# Patient Record
Sex: MALE | Race: WHITE | NOT HISPANIC OR LATINO | Employment: PART TIME | URBAN - METROPOLITAN AREA
[De-identification: names, ages, dates, MRNs, and addresses within clinical notes are randomized per-mention and may not be internally consistent; named-entity substitution may affect disease eponyms.]

---

## 2018-02-04 ENCOUNTER — HOSPITAL ENCOUNTER (EMERGENCY)
Facility: HOSPITAL | Age: 23
Discharge: HOME | End: 2018-02-04
Attending: EMERGENCY MEDICINE
Payer: MEDICAID

## 2018-02-04 VITALS
HEIGHT: 68 IN | DIASTOLIC BLOOD PRESSURE: 69 MMHG | BODY MASS INDEX: 20.46 KG/M2 | OXYGEN SATURATION: 100 % | TEMPERATURE: 98 F | RESPIRATION RATE: 18 BRPM | HEART RATE: 61 BPM | SYSTOLIC BLOOD PRESSURE: 117 MMHG | WEIGHT: 135 LBS

## 2018-02-04 DIAGNOSIS — R11.2 NON-INTRACTABLE VOMITING WITH NAUSEA, UNSPECIFIED VOMITING TYPE: Primary | ICD-10-CM

## 2018-02-04 DIAGNOSIS — F10.920 ALCOHOLIC INTOXICATION WITHOUT COMPLICATION: ICD-10-CM

## 2018-02-04 LAB
ALBUMIN SERPL BCP-MCNC: 4.7 G/DL
ALP SERPL-CCNC: 85 U/L
ALT SERPL W/O P-5'-P-CCNC: 34 U/L
ANION GAP SERPL CALC-SCNC: 8 MMOL/L
AST SERPL-CCNC: 30 U/L
BASOPHILS # BLD AUTO: 0.03 K/UL
BASOPHILS NFR BLD: 0.4 %
BILIRUB SERPL-MCNC: 0.7 MG/DL
BUN SERPL-MCNC: 9 MG/DL
CALCIUM SERPL-MCNC: 9.4 MG/DL
CHLORIDE SERPL-SCNC: 105 MMOL/L
CO2 SERPL-SCNC: 27 MMOL/L
CREAT SERPL-MCNC: 1 MG/DL
DIFFERENTIAL METHOD: ABNORMAL
EOSINOPHIL # BLD AUTO: 0.1 K/UL
EOSINOPHIL NFR BLD: 0.9 %
ERYTHROCYTE [DISTWIDTH] IN BLOOD BY AUTOMATED COUNT: 13.1 %
EST. GFR  (AFRICAN AMERICAN): >60 ML/MIN/1.73 M^2
EST. GFR  (NON AFRICAN AMERICAN): >60 ML/MIN/1.73 M^2
ETHANOL SERPL-MCNC: 121 MG/DL
GLUCOSE SERPL-MCNC: 174 MG/DL
HCT VFR BLD AUTO: 42 %
HGB BLD-MCNC: 14.7 G/DL
LYMPHOCYTES # BLD AUTO: 0.8 K/UL
LYMPHOCYTES NFR BLD: 10.1 %
MCH RBC QN AUTO: 31.6 PG
MCHC RBC AUTO-ENTMCNC: 35 G/DL
MCV RBC AUTO: 90 FL
MONOCYTES # BLD AUTO: 0.4 K/UL
MONOCYTES NFR BLD: 5.3 %
NEUTROPHILS # BLD AUTO: 6.5 K/UL
NEUTROPHILS NFR BLD: 83.3 %
PLATELET # BLD AUTO: 148 K/UL
PMV BLD AUTO: 9.9 FL
POTASSIUM SERPL-SCNC: 4.1 MMOL/L
PROT SERPL-MCNC: 7.2 G/DL
RBC # BLD AUTO: 4.65 M/UL
SODIUM SERPL-SCNC: 140 MMOL/L
WBC # BLD AUTO: 7.74 K/UL

## 2018-02-04 PROCEDURE — 63600175 PHARM REV CODE 636 W HCPCS: Performed by: EMERGENCY MEDICINE

## 2018-02-04 PROCEDURE — 96374 THER/PROPH/DIAG INJ IV PUSH: CPT

## 2018-02-04 PROCEDURE — 36415 COLL VENOUS BLD VENIPUNCTURE: CPT

## 2018-02-04 PROCEDURE — 80053 COMPREHEN METABOLIC PANEL: CPT

## 2018-02-04 PROCEDURE — 85025 COMPLETE CBC W/AUTO DIFF WBC: CPT

## 2018-02-04 PROCEDURE — 96361 HYDRATE IV INFUSION ADD-ON: CPT

## 2018-02-04 PROCEDURE — 99283 EMERGENCY DEPT VISIT LOW MDM: CPT | Mod: 25

## 2018-02-04 PROCEDURE — 25000003 PHARM REV CODE 250: Performed by: EMERGENCY MEDICINE

## 2018-02-04 PROCEDURE — 80320 DRUG SCREEN QUANTALCOHOLS: CPT

## 2018-02-04 RX ORDER — ONDANSETRON 4 MG/1
4 TABLET, FILM COATED ORAL EVERY 8 HOURS PRN
Qty: 12 TABLET | Refills: 0 | Status: SHIPPED | OUTPATIENT
Start: 2018-02-04

## 2018-02-04 RX ORDER — ONDANSETRON 2 MG/ML
4 INJECTION INTRAMUSCULAR; INTRAVENOUS
Status: COMPLETED | OUTPATIENT
Start: 2018-02-04 | End: 2018-02-04

## 2018-02-04 RX ADMIN — SODIUM CHLORIDE 1000 ML: 0.9 INJECTION, SOLUTION INTRAVENOUS at 05:02

## 2018-02-04 RX ADMIN — ONDANSETRON 4 MG: 2 INJECTION INTRAMUSCULAR; INTRAVENOUS at 05:02

## 2018-02-04 NOTE — ED PROVIDER NOTES
"SCRIBE #1 NOTE: I, Joan Zhang, am scribing for, and in the presence of, Jessica Bowens MD. I have scribed the HPI, ROS, and PEx of the note.     SCRIBE #2 NOTE: I, Adelfoferddy Josue, am scribing for, and in the presence of,  Melissa Brannon MD. I have scribed the remaining portions of the note not scribed by Scribe #1.     History      Chief Complaint   Patient presents with    Emesis     etoh , he states " i think i have alcohol poison"        Review of patient's allergies indicates:  No Known Allergies     HPI   HPI    2/4/2018, 5:25 AM   History obtained from the patient      History of Present Illness: Sameer Chakraborty is a 22 y.o. male patient who presents to the Emergency Department for emesis which onset PTA. Symptoms are episodic and moderate in severity. Pt states, "i think I have alcohol poison".  Pt reports having 6-7 mixed drinks. No mitigating or exacerbating factors reported. Associated sxs include nausea. Patient denies any LOC, HA, diarrhea, weakness, numbness, and all other sxs at this time. No prior Tx. No further complaints or concerns at this time.     Arrival mode: Personal vehicle     PCP: Primary Doctor No       Past Medical History:  Past Medical History:   Diagnosis Date    ADHD (attention deficit hyperactivity disorder)      Past Surgical History:  Past surgical history reviewed not relevant      Family History:  Family history reviewed not relevant    Social History:  Social History     Social History Main Topics    Smoking status: Former Smoker    Smokeless tobacco: unknown    Alcohol use No    Drug use: Unknown    Sexual activity: Yes       ROS   Review of Systems   Constitutional: Negative for chills and fever.        (+) Etoh   HENT: Negative for sore throat.    Respiratory: Negative for shortness of breath.    Cardiovascular: Negative for chest pain.   Gastrointestinal: Positive for nausea and vomiting. Negative for abdominal pain and diarrhea.   Genitourinary: Negative for " "dysuria.   Musculoskeletal: Negative for back pain and neck pain.   Skin: Negative for rash.   Neurological: Negative for syncope, weakness, numbness and headaches.   Hematological: Does not bruise/bleed easily.   All other systems reviewed and are negative.      Physical Exam      Initial Vitals [02/04/18 0505]   BP Pulse Resp Temp SpO2   117/69 61 18 98.4 °F (36.9 °C) 100 %      MAP       85          Physical Exam  Nursing Notes and Vital Signs Reviewed.  Constitutional: Patient is in no acute distress. Well-developed and well-nourished.  Head: Atraumatic. Normocephalic.  Eyes: PERRL. EOM intact. Conjunctivae are not pale. No scleral icterus.  ENT: Mucous membranes are moist. Oropharynx is clear and symmetric.    Neck: Supple. Full ROM. No lymphadenopathy.  Cardiovascular: Regular rate. Regular rhythm. No murmurs, rubs, or gallops. Distal pulses are 2+ and symmetric.  Pulmonary/Chest: No respiratory distress. Clear to auscultation bilaterally. No wheezing or rales.  Abdominal: Soft and non-distended.  There is no tenderness.  No rebound, guarding, or rigidity. Good bowel sounds.  Genitourinary: No CVA tenderness  Musculoskeletal: Moves all extremities. No obvious deformities. No edema. No calf tenderness.  Skin: Warm and dry.  Neurological: Patient is alert and oriented to person, place and time. Pupils ERRL and EOM normal. Cranial nerves II-XII are intact. Strength is full bilaterally; it is equal and 5/5 in bilateral upper and lower extremities. There is no pronator drift of outstretched arms. Light touch sense is intact. Speech is clear and normal. No acute focal neurological deficits noted.  Psychiatric: Normal affect. Good eye contact. Appropriate in content.    ED Course    Procedures  ED Vital Signs:  Vitals:    02/04/18 0505   BP: 117/69   Pulse: 61   Resp: 18   Temp: 98.4 °F (36.9 °C)   SpO2: 100%   Weight: 61.2 kg (135 lb)   Height: 5' 8" (1.727 m)       Abnormal Lab Results:  Labs Reviewed   CBC W/ AUTO " DIFFERENTIAL - Abnormal; Notable for the following:        Result Value    MCH 31.6 (*)     Platelets 148 (*)     Lymph # 0.8 (*)     Gran% 83.3 (*)     Lymph% 10.1 (*)     All other components within normal limits   COMPREHENSIVE METABOLIC PANEL - Abnormal; Notable for the following:     Glucose 174 (*)     All other components within normal limits   ALCOHOL,MEDICAL (ETHANOL) - Abnormal; Notable for the following:     Alcohol, Medical, Serum 121 (*)     All other components within normal limits        All Lab Results:  Results for orders placed or performed during the hospital encounter of 02/04/18   CBC auto differential   Result Value Ref Range    WBC 7.74 3.90 - 12.70 K/uL    RBC 4.65 4.60 - 6.20 M/uL    Hemoglobin 14.7 14.0 - 18.0 g/dL    Hematocrit 42.0 40.0 - 54.0 %    MCV 90 82 - 98 fL    MCH 31.6 (H) 27.0 - 31.0 pg    MCHC 35.0 32.0 - 36.0 g/dL    RDW 13.1 11.5 - 14.5 %    Platelets 148 (L) 150 - 350 K/uL    MPV 9.9 9.2 - 12.9 fL    Gran # (ANC) 6.5 1.8 - 7.7 K/uL    Lymph # 0.8 (L) 1.0 - 4.8 K/uL    Mono # 0.4 0.3 - 1.0 K/uL    Eos # 0.1 0.0 - 0.5 K/uL    Baso # 0.03 0.00 - 0.20 K/uL    Gran% 83.3 (H) 38.0 - 73.0 %    Lymph% 10.1 (L) 18.0 - 48.0 %    Mono% 5.3 4.0 - 15.0 %    Eosinophil% 0.9 0.0 - 8.0 %    Basophil% 0.4 0.0 - 1.9 %    Differential Method Automated    Comprehensive metabolic panel   Result Value Ref Range    Sodium 140 136 - 145 mmol/L    Potassium 4.1 3.5 - 5.1 mmol/L    Chloride 105 95 - 110 mmol/L    CO2 27 23 - 29 mmol/L    Glucose 174 (H) 70 - 110 mg/dL    BUN, Bld 9 6 - 20 mg/dL    Creatinine 1.0 0.5 - 1.4 mg/dL    Calcium 9.4 8.7 - 10.5 mg/dL    Total Protein 7.2 6.0 - 8.4 g/dL    Albumin 4.7 3.5 - 5.2 g/dL    Total Bilirubin 0.7 0.1 - 1.0 mg/dL    Alkaline Phosphatase 85 55 - 135 U/L    AST 30 10 - 40 U/L    ALT 34 10 - 44 U/L    Anion Gap 8 8 - 16 mmol/L    eGFR if African American >60 >60 mL/min/1.73 m^2    eGFR if non African American >60 >60 mL/min/1.73 m^2   Ethanol   Result  Value Ref Range    Alcohol, Medical, Serum 121 (H) <10 mg/dL                The Emergency Provider reviewed the vital signs and test results, which are outlined above.    ED Discussion     5:59 AM: Dr. Bowens transfers care of pt to Dr. Brannon, pending labs results.    7:32 AM: Dr. Brannon re-evaluated pt. Pt is resting comfortably and is in no acute distress. D/w pt all pertinent results. Pt will be PO challenged prior to discharge. Answered all pt's questions. Pt expresses understanding at this time.    7:55 AM: Reassessed pt. Pt states their condition has improved at this time. Pt tolerating PO, friend at bedside. Discussed with pt all pertinent ED information and results. Discussed plan of treatment with pt. Gave pt all f/u and return to the ED instructions. All questions and concerns were addressed at this time. Pt understands and agrees to plan as discussed. Pt is stable for discharge.     ED Medication(s):  Medications   sodium chloride 0.9% bolus 1,000 mL (0 mLs Intravenous Stopped 2/4/18 0625)   ondansetron injection 4 mg (4 mg Intravenous Given 2/4/18 3289)       New Prescriptions    ONDANSETRON (ZOFRAN) 4 MG TABLET    Take 1 tablet (4 mg total) by mouth every 8 (eight) hours as needed.       Follow-up Information     [facility]. Schedule an appointment as soon as possible for a visit in 2 days.    Why:  Return to the Emergency room, If symptoms worsen  Contact information:  [address] [place] [id number] [phone]                     Medical Decision Making    Medical Decision Making:   Clinical Tests:   Lab Tests: Reviewed and Ordered           Scribe Attestation:   Scribe #1: I performed the above scribed service and the documentation accurately describes the services I performed. I attest to the accuracy of the note.    Attending:   Physician Attestation Statement for Scribe #1: I, Jessica Bowens MD, personally performed the services described in this documentation, as  scribed by Joan Zhang, in my presence, and it is both accurate and complete.       Scribe Attestation:   Scribe #2: I performed the above scribed service and the documentation accurately describes the services I performed. I attest to the accuracy of the note.    Attending Attestation:           Physician Attestation for Scribe:    Physician Attestation Statement for Scribe #2: I, Melissa Brannon MD, reviewed documentation, as scribed by Adelfo Josue in my presence, and it is both accurate and complete. I also acknowledge and confirm the content of the note done by Scribe #1.          Clinical Impression       ICD-10-CM ICD-9-CM   1. Non-intractable vomiting with nausea, unspecified vomiting type R11.2 787.01   2. Alcoholic intoxication without complication F10.920 305.00       Disposition:   Disposition: Discharged  Condition: Stable         Melissa Brannon MD  02/06/18 0115

## 2018-09-09 ENCOUNTER — OFFICE VISIT (OUTPATIENT)
Dept: URGENT CARE | Facility: CLINIC | Age: 23
End: 2018-09-09
Payer: MEDICAID

## 2018-09-09 VITALS
OXYGEN SATURATION: 96 % | HEIGHT: 68 IN | SYSTOLIC BLOOD PRESSURE: 140 MMHG | HEART RATE: 63 BPM | TEMPERATURE: 98 F | RESPIRATION RATE: 16 BRPM | BODY MASS INDEX: 20 KG/M2 | DIASTOLIC BLOOD PRESSURE: 76 MMHG | WEIGHT: 132 LBS

## 2018-09-09 DIAGNOSIS — Z20.2 STD EXPOSURE: Primary | ICD-10-CM

## 2018-09-09 PROCEDURE — 99203 OFFICE O/P NEW LOW 30 MIN: CPT | Mod: S$GLB,,, | Performed by: PHYSICIAN ASSISTANT

## 2018-09-09 NOTE — PROGRESS NOTES
"Subjective:       Patient ID: Sameer Chakraborty is a 22 y.o. male.    Vitals:  height is 5' 8" (1.727 m) and weight is 59.9 kg (132 lb). His tympanic temperature is 97.6 °F (36.4 °C). His blood pressure is 140/76 (abnormal) and his pulse is 63. His respiration is 16 and oxygen saturation is 96%.     Chief Complaint: Exposure to STD    Patient is not having symptoms but has been exposed to Herpes.      Exposure to STD   The patient is experiencing no pain. Pertinent negatives include no abdominal pain, chest pain, chills, diarrhea, fever, headaches, joint pain, nausea, rash, shortness of breath, sore throat or vomiting. He never uses condoms. It is possible that his partner has an STD.     Review of Systems   Constitution: Negative. Negative for chills and fever.   HENT: Negative for sore throat.    Eyes: Negative for blurred vision.   Cardiovascular: Negative for chest pain.   Respiratory: Negative for shortness of breath.    Skin: Negative for rash.   Musculoskeletal: Negative.  Negative for back pain and joint pain.   Gastrointestinal: Negative for abdominal pain, diarrhea, nausea and vomiting.   Neurological: Negative for headaches.   Psychiatric/Behavioral: The patient is not nervous/anxious.        Objective:      Physical Exam   Constitutional: He is oriented to person, place, and time. He appears well-developed and well-nourished. He is cooperative.  Non-toxic appearance. He does not appear ill. No distress.   HENT:   Head: Normocephalic and atraumatic.   Right Ear: Hearing, tympanic membrane, external ear and ear canal normal.   Left Ear: Hearing, tympanic membrane, external ear and ear canal normal.   Nose: Nose normal. No mucosal edema, rhinorrhea or nasal deformity. No epistaxis. Right sinus exhibits no maxillary sinus tenderness and no frontal sinus tenderness. Left sinus exhibits no maxillary sinus tenderness and no frontal sinus tenderness.   Mouth/Throat: Uvula is midline, oropharynx is clear and moist " and mucous membranes are normal. No trismus in the jaw. Normal dentition. No uvula swelling. No posterior oropharyngeal erythema.   Eyes: Conjunctivae and lids are normal. Right eye exhibits no discharge. Left eye exhibits no discharge. No scleral icterus.   Sclera clear bilat   Neck: Trachea normal, normal range of motion, full passive range of motion without pain and phonation normal. Neck supple.   Cardiovascular: Normal rate, regular rhythm, normal heart sounds, intact distal pulses and normal pulses.   Pulmonary/Chest: Effort normal and breath sounds normal. No respiratory distress.   Abdominal: Soft. Normal appearance and bowel sounds are normal. He exhibits no distension, no pulsatile midline mass and no mass. There is no tenderness.   Musculoskeletal: Normal range of motion. He exhibits no edema or deformity.   Neurological: He is alert and oriented to person, place, and time. He exhibits normal muscle tone. Coordination normal.   Skin: Skin is warm, dry and intact. He is not diaphoretic. No pallor.   Psychiatric: He has a normal mood and affect. His speech is normal and behavior is normal. Judgment and thought content normal. Cognition and memory are normal.   Nursing note and vitals reviewed.      Assessment:       1. STD exposure        Plan:         STD exposure  -     C. trachomatis/N. gonorrhoeae by AMP DNA  -     HIV-1 and HIV-2 antibodies  -     Herpes simplex type 1&2 IgG,Herpes titer  -     RPR          What Are Sexually Transmitted Diseases (STDs)?  A sexually transmitted disease (STD) is a disease that is spread during sex. (An STD can also be called STI for sexually transmitted infection.) You can become infected with an STD if you have sex with someone who has an STD. Any sex that involves the penis, vagina, anus, or mouth can spread disease. Some STDs spread through body fluids such as semen, vaginal fluid, or blood. Others spread through contact with affected skin.  Who is at risk?     Places  on or in the body where STDs cause damage include reproductive organs, the rectum, and the mouth.   It doesnt matter if youre straight or glez, male or female, young or old. Any person who has sex can get an STD. Your risk increases if:  · You have more than one partner. The more partners you have, the greater your risk.  · Your partner has other partners. If your partner is exposed to an STD, you could be, too.  · You or your partner have had sex with other people in the past. Either of you might be carrying an STD from an earlier partner.  · You have an STD. The STD may cause sores or other health problems that increase your risk of new infections. Your risk will stay high unless you change the behaviors that put you at risk of the current infection.  Prevent future problems  Left untreated, certain STDs can lead to cancer or even death. Some can harm unborn babies whose mothers are infected. Others can cause you to not be able to have children (sterility) or can affect changes in behavior or your ability to think. You can prevent these problems with safer sex, regular checkups, and early treatment. Always use a latex condom when you have sex. Get tested if youre at risk. And get treated early if you have an STD.  Getting checked  The only sure way to know if you have an STD is to get checked by a healthcare provider. If you notice a change in how your body looks or feels, have it checked out. But keep in mind, STDs dont always show symptoms. So if youre at risk of STDs, get checked regularly. If you find you have an STD, be sure your partner gets treatment, too. If not, his or her health is at risk. And left untreated, your partner could pass the STD back to you, or on to others.  Common symptoms  Be alert to any changes in your body and your partners body. Symptoms may appear in or near the vagina, penis, rectum, mouth, or throat. They include:  · Unusual discharge  · Lumps, bumps, or rashes  · Sores that  may be painful, itchy, or painless  · Itchy skin  · Burning with urination  · Pain in the pelvis, belly (abdomen), or rectum  Even if you dont have symptoms  You may have an STD, even if you dont have symptoms. If you think you are at risk, get checked. Go to a clinic or to your healthcare provider. If your partner has an STD, you need to be tested too, even if you feel fine.  Vaccines to prevent disease  Vaccines (also called immunizations) are available to prevent hepatitis A and hepatitis B. These are two kinds of STDs. There is also a vaccine to prevent HPV. This is a virus that can be passed from person to person through sexual contact. Ask your healthcare provider whether any of these vaccines is right for you.   Date Last Reviewed: 11/1/2016  © 6830-9906 [employer]. [address] [place] [address]. All rights reserved. This information is not intended as a substitute for professional medical care. Always follow your healthcare professional's instructions.      WE WILL CONTACT YOU WITH YOUR RESULTS.     Please follow up with your Primary care provider within 2-5 days if your signs and symptoms have not resolved or worsen.     If your condition worsens or fails to improve we recommend that you receive another evaluation at the emergency room immediately or contact your primary medical clinic to discuss your concerns.   You must understand that you have received an Urgent Care treatment only and that you may be released before all of your medical problems are known or treated. You, the patient, will arrange for follow up care as instructed.     RED FLAGS/WARNING SYMPTOMS DISCUSSED WITH PATIENT THAT WOULD WARRANT EMERGENT MEDICAL ATTENTION. PATIENT VERBALIZED UNDERSTANDING.

## 2018-09-09 NOTE — PATIENT INSTRUCTIONS
What Are Sexually Transmitted Diseases (STDs)?  A sexually transmitted disease (STD) is a disease that is spread during sex. (An STD can also be called STI for sexually transmitted infection.) You can become infected with an STD if you have sex with someone who has an STD. Any sex that involves the penis, vagina, anus, or mouth can spread disease. Some STDs spread through body fluids such as semen, vaginal fluid, or blood. Others spread through contact with affected skin.  Who is at risk?     Places on or in the body where STDs cause damage include reproductive organs, the rectum, and the mouth.   It doesnt matter if youre straight or glez, male or female, young or old. Any person who has sex can get an STD. Your risk increases if:  · You have more than one partner. The more partners you have, the greater your risk.  · Your partner has other partners. If your partner is exposed to an STD, you could be, too.  · You or your partner have had sex with other people in the past. Either of you might be carrying an STD from an earlier partner.  · You have an STD. The STD may cause sores or other health problems that increase your risk of new infections. Your risk will stay high unless you change the behaviors that put you at risk of the current infection.  Prevent future problems  Left untreated, certain STDs can lead to cancer or even death. Some can harm unborn babies whose mothers are infected. Others can cause you to not be able to have children (sterility) or can affect changes in behavior or your ability to think. You can prevent these problems with safer sex, regular checkups, and early treatment. Always use a latex condom when you have sex. Get tested if youre at risk. And get treated early if you have an STD.  Getting checked  The only sure way to know if you have an STD is to get checked by a healthcare provider. If you notice a change in how your body looks or feels, have it checked out. But keep in mind,  STDs dont always show symptoms. So if youre at risk of STDs, get checked regularly. If you find you have an STD, be sure your partner gets treatment, too. If not, his or her health is at risk. And left untreated, your partner could pass the STD back to you, or on to others.  Common symptoms  Be alert to any changes in your body and your partners body. Symptoms may appear in or near the vagina, penis, rectum, mouth, or throat. They include:  · Unusual discharge  · Lumps, bumps, or rashes  · Sores that may be painful, itchy, or painless  · Itchy skin  · Burning with urination  · Pain in the pelvis, belly (abdomen), or rectum  Even if you dont have symptoms  You may have an STD, even if you dont have symptoms. If you think you are at risk, get checked. Go to a clinic or to your healthcare provider. If your partner has an STD, you need to be tested too, even if you feel fine.  Vaccines to prevent disease  Vaccines (also called immunizations) are available to prevent hepatitis A and hepatitis B. These are two kinds of STDs. There is also a vaccine to prevent HPV. This is a virus that can be passed from person to person through sexual contact. Ask your healthcare provider whether any of these vaccines is right for you.   Date Last Reviewed: 11/1/2016 © 2000-2017 [employer]. [address] [place] [address]. All rights reserved. This information is not intended as a substitute for professional medical care. Always follow your healthcare professional's instructions.      WE WILL CONTACT YOU WITH YOUR RESULTS.     Please follow up with your Primary care provider within 2-5 days if your signs and symptoms have not resolved or worsen.     If your condition worsens or fails to improve we recommend that you receive another evaluation at the emergency room immediately or contact your primary medical clinic to discuss your concerns.   You must understand that you have received an Urgent Care treatment  only and that you may be released before all of your medical problems are known or treated. You, the patient, will arrange for follow up care as instructed.     RED FLAGS/WARNING SYMPTOMS DISCUSSED WITH PATIENT THAT WOULD WARRANT EMERGENT MEDICAL ATTENTION. PATIENT VERBALIZED UNDERSTANDING.

## 2018-09-11 ENCOUNTER — TELEPHONE (OUTPATIENT)
Dept: URGENT CARE | Facility: CLINIC | Age: 23
End: 2018-09-11

## 2018-09-11 LAB
C TRACH RRNA SPEC QL NAA+PROBE: NEGATIVE
HIV 1+2 AB+HIV1 P24 AG SERPL QL IA: NON REACTIVE
HSV1 IGG SER IA-ACNC: <0.91 INDEX (ref 0–0.9)
HSV2 IGG SER IA-ACNC: <0.91 INDEX (ref 0–0.9)
N GONORRHOEA RRNA SPEC QL NAA+PROBE: NEGATIVE
RPR SER QL: NON REACTIVE

## 2024-12-05 ENCOUNTER — OFFICE VISIT (OUTPATIENT)
Dept: URGENT CARE | Facility: CLINIC | Age: 29
End: 2024-12-05
Payer: OTHER MISCELLANEOUS

## 2024-12-05 VITALS
HEIGHT: 68 IN | SYSTOLIC BLOOD PRESSURE: 124 MMHG | BODY MASS INDEX: 20 KG/M2 | DIASTOLIC BLOOD PRESSURE: 88 MMHG | HEART RATE: 61 BPM | RESPIRATION RATE: 17 BRPM | TEMPERATURE: 98 F | WEIGHT: 132 LBS | OXYGEN SATURATION: 98 %

## 2024-12-05 DIAGNOSIS — Z02.6 ENCOUNTER RELATED TO WORKER'S COMPENSATION CLAIM: Primary | ICD-10-CM

## 2024-12-05 DIAGNOSIS — S43.492A OTHER SPRAIN OF LEFT SHOULDER JOINT, INITIAL ENCOUNTER: ICD-10-CM

## 2024-12-05 RX ORDER — NAPROXEN 500 MG/1
500 TABLET ORAL 2 TIMES DAILY WITH MEALS
Qty: 20 TABLET | Refills: 0 | Status: SHIPPED | OUTPATIENT
Start: 2024-12-05 | End: 2024-12-15

## 2024-12-05 RX ORDER — METHOCARBAMOL 500 MG/1
1000 TABLET, FILM COATED ORAL 3 TIMES DAILY
Qty: 30 TABLET | Refills: 0 | Status: SHIPPED | OUTPATIENT
Start: 2024-12-05 | End: 2024-12-10

## 2024-12-05 NOTE — PROGRESS NOTES
Subjective:      Patient ID: Sameer Chakraborty is a 29 y.o. male.    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  Reports pain and tenderness at the left AC joint and shoulder.  X-ray technician only able to do chest x-rays and able to visualize what appears to be slightly irregular clavicular head.  Will return tomorrow for x-rays dedicated to the shoulder and clavicle and AC joint.  Placed in sling and paperwork filled out for light duty and placed on an anti-inflammatory and muscle relaxant.  He will return tomorrow morning and then return in 1 week.    Shoulder Injury   The incident occurred at work. The left shoulder is affected. The incident occurred 1 to 3 hours ago. The injury mechanism was repetitive motion. The quality of the pain is described as aching. The pain does not radiate. The pain is at a severity of 7/10. The pain is mild. Pertinent negatives include no chest pain, muscle weakness, numbness or tingling. The symptoms are aggravated by overhead lifting and palpation. He has tried nothing for the symptoms.     ROS  Constitution: Negative for chills and fever.   HENT:  Negative for postnasal drip, sinus pain and sore throat.    Neck: Negative for neck pain and neck stiffness.   Cardiovascular:  Negative for chest pain and palpitations.   Respiratory:  Negative for cough and shortness of breath.    Genitourinary:  Negative for dysuria and hematuria.   Musculoskeletal:  Positive for pain and joint pain.   Skin:  Negative for wound and  laceration.   Neurological:  Negative for altered mental status, numbness and tingling.   Psychiatric/Behavioral:  Negative for altered mental status.      Objective:     Physical Exam  Vitals and nursing note reviewed.   Constitutional:       General: He is not in acute distress.     Appearance: Normal appearance. He is well-developed. He is not diaphoretic.   HENT:      Head: Normocephalic and atraumatic.      Nose: Nose normal.   Eyes:      General: Lids are normal.      Conjunctiva/sclera: Conjunctivae normal.   Neck:      Trachea: Trachea and phonation normal.   Cardiovascular:      Rate and Rhythm: Normal rate and regular rhythm.      Pulses: Normal pulses.      Heart sounds: Normal heart sounds.   Pulmonary:      Effort: Pulmonary effort is normal.      Breath sounds: Normal breath sounds.   Abdominal:      General: Bowel sounds are normal. There is no abdominal bruit.      Palpations: Abdomen is soft. There is no mass or pulsatile mass.   Musculoskeletal:         General: Tenderness and signs of injury present. No swelling or deformity. Normal range of motion.      Cervical back: Full passive range of motion without pain, normal range of motion and neck supple.      Comments: While range of motion is normal and there is no crepitus or swelling, there is tenderness to palpation of the left AC joint and posterior deltoid.  No pain on internal external rotation.  Distally neurovascularly intact.   Skin:     General: Skin is warm and dry.   Neurological:      Mental Status: He is alert and oriented to person, place, and time.      Sensory: No sensory deficit.      Deep Tendon Reflexes: Reflexes are normal and symmetric.   Psychiatric:         Speech: Speech normal.         Behavior: Behavior normal. Behavior is cooperative.         Thought Content: Thought content normal.         Judgment: Judgment normal.      X-Ray Acromioclavicular Joints Bilateral W WO Weights    Result Date: 12/6/2024  EXAMINATION: XR  ACROMIOCLAVICULAR JOINTS BILATERAL W WO WEIGHTS CLINICAL HISTORY: Pain in left shoulder TECHNIQUE: Bilateral AC joints two views COMPARISON: None FINDINGS: The AC joints are well maintained bilaterally.  No fracture or dislocation.  No bone destruction identified .     See above Electronically signed by: Avila Bedolla MD Date:    12/06/2024 Time:    10:05    X-Ray Shoulder 2 or More Views Left    Result Date: 12/6/2024  EXAMINATION: XR SHOULDER COMPLETE 2 OR MORE VIEWS LEFT CLINICAL HISTORY: Pain in left shoulder TECHNIQUE: Two or three views of the left shoulder were performed. COMPARISON: None FINDINGS: AP, axillary and scapular Y radiographs of the left shoulder demonstrate no evidence of acute fracture or dislocation.  Humeral head is well seen within the glenoid fossa.  The acromioclavicular distance is within normal limits.  Visualized left hemithorax is clear.     No evidence for acute injury Electronically signed by: Giancarlo Dennis MD Date:    12/06/2024 Time:    09:56    XR CHEST PA AND LATERAL    Result Date: 12/5/2024  EXAMINATION: XR CHEST PA AND LATERAL CLINICAL HISTORY: Other sprain of left shoulder joint, initial encounter TECHNIQUE: PA and lateral views of the chest were performed. COMPARISON: None FINDINGS: The cardiomediastinal silhouette is not enlarged.  There is no pleural effusion.  The trachea is midline.  The lungs are symmetrically expanded bilaterally without evidence of acute parenchymal process. No large focal consolidation seen.  There is no pneumothorax.  The osseous structures are unremarkable.     1. No acute cardiopulmonary process. Electronically signed by: Bon Lozoya MD Date:    12/05/2024 Time:    18:19       Assessment:      1. Encounter related to worker's compensation claim    2. 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  Reports pain and tenderness at the left AC  joint and shoulder.  X-ray technician only able to do chest x-rays and able to visualize what appears to be slightly irregular clavicular head.  Will return tomorrow for x-rays dedicated to the shoulder and clavicle and AC joint.  Placed in sling and paperwork filled out for light duty and placed on an anti-inflammatory and muscle relaxant.  He will return tomorrow morning and then return in 1 week.    Medications Ordered This Encounter   Medications    methocarbamoL (ROBAXIN) 500 MG Tab     Sig: Take 2 tablets (1,000 mg total) by mouth 3 (three) times daily. for 5 days     Dispense:  30 tablet     Refill:  0    naproxen (NAPROSYN) 500 MG tablet     Sig: Take 1 tablet (500 mg total) by mouth 2 (two) times daily with meals. for 10 days     Dispense:  20 tablet     Refill:  0     Patient Instructions: Attention not to aggravate affected area, Use splint as directed (SLING)   Restrictions: No lifting/pushing/pulling more than 10 lbs, Limited use of left hand and arm, No above the shoulder/overhead work  Follow up in about 1 day (around 12/6/2024) for TOMORROW MORNING FOR DEDICATED SHOULDER FILMS AND RE-EVALUATION.

## 2024-12-05 NOTE — LETTER
[facility] [place] [address]  Phone: [phone]  Fax: [phone]  [facility]: [id number]    Pt Name: Sameer HERRERA Hand  Injury Date: 12/05/2024   Employee ID:  Date of First Treatment: 12/05/2024   Company: [employer]      Appointment Time: 04:20 PM Arrived: 17:00   Provider: Reji Proctor MD Time Out:18:30     Office Treatment:   1. Encounter related to worker's compensation claim    2. Other sprain of left shoulder joint, initial encounter                     Return Appointment: 12/6/2024 AT 10AM

## 2024-12-05 NOTE — LETTER
[facility] - [place]  [place] [address]  Phone: [phone]  Fax: [phone]  [facility]: [id number]    Pt Name: Sameer HERRERA Hand  Injury Date: 12/05/2024   Employee ID:  Date of First Treatment: 12/05/2024   Company: [employer]      Appointment Time: 04:20 PM Arrived: 17:00   Provider: Reji Proctor MD Time Out:19:00     Office Treatment:   1. Encounter related to worker's compensation claim    2. Other sprain of left shoulder joint, initial encounter      Medications Ordered This Encounter   Medications    methocarbamoL (ROBAXIN) 500 MG Tab    naproxen (NAPROSYN) 500 MG tablet      Patient Instructions: Attention not to aggravate affected area, Use splint as directed (SLING)    Restrictions: No lifting/pushing/pulling more than 10 lbs, Limited use of left hand and arm, No above the shoulder/overhead work     Return Appointment: TOMORROW 12/6/2024

## 2024-12-06 ENCOUNTER — OFFICE VISIT (OUTPATIENT)
Dept: URGENT CARE | Facility: CLINIC | Age: 29
End: 2024-12-06
Payer: OTHER MISCELLANEOUS

## 2024-12-06 VITALS
HEART RATE: 62 BPM | BODY MASS INDEX: 20 KG/M2 | DIASTOLIC BLOOD PRESSURE: 71 MMHG | SYSTOLIC BLOOD PRESSURE: 132 MMHG | RESPIRATION RATE: 18 BRPM | TEMPERATURE: 98 F | WEIGHT: 132 LBS | OXYGEN SATURATION: 98 % | HEIGHT: 68 IN

## 2024-12-06 DIAGNOSIS — M25.512 ACUTE PAIN OF LEFT SHOULDER: ICD-10-CM

## 2024-12-06 DIAGNOSIS — Z02.6 ENCOUNTER RELATED TO WORKER'S COMPENSATION CLAIM: Primary | ICD-10-CM

## 2024-12-06 NOTE — PROGRESS NOTES
Subjective:      Patient ID: Sameer Chakraborty is a 29 y.o. male.    Chief Complaint: Shoulder Injury (Left)    Patient's place of employment -  [employer]  Patient's job title - Recruit   Date of injury -  12/5/24  Body part injured including left or right - Left Shoulder   Current work status per last visit - Not working   Improved, same, or worse - Same  Pain scale right now - 6/10 (still hasn't picked up medication)   EC    PATIENT PRESENTS FOR FOLLOW-UP AFTER LAST NIGHT'S URGENT CARE VISIT.  DEDICATED X-RAYS OF THE LEFT SHOULDER SHOWS NO ACUTE FRACTURE DISLOCATION.  THERE IS SOME ASYMMETRY FROM THE RIGHT DISTAL CLAVICLE TO THE LEFT DISTAL CLAVICLE HOWEVER NO OBVIOUS FRACTURE.  RADIOLOGY HAS READ THIS AS NORMAL AND NEGATIVE.  IT IS NOT HIGH RIDING AND NO SIGNS OF SEPARATION.  ENCOURAGED REST, ICE, LIGHT DUTY, ANTI-INFLAMMATORY AND TO FOLLOW UP IN 1 WEEK.  PROVIDED SLING FOR COMFORT.  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  THERE WAS AN UPWARD PUSHING MOTION AND NO DIRECT IMPACT OR FALL OR TRAUMA.      ROS  Constitution: Negative for chills and fever.   HENT:  Negative for postnasal drip, sinus pain and sore throat.    Neck: Negative for neck pain and neck stiffness.   Cardiovascular:  Negative for chest pain and palpitations.   Respiratory:  Negative for cough and shortness of breath.    Genitourinary:  Negative for dysuria and hematuria.   Musculoskeletal:  Positive for pain, joint pain, joint swelling, abnormal ROM of joint, muscle cramps and muscle ache.   Skin:  Negative for wound and laceration.   Neurological:  Negative for altered mental status, numbness and tingling.   Psychiatric/Behavioral:  Negative for altered mental status.      Objective:     Physical Exam  Vitals and nursing note reviewed.   Constitutional:       General: He is not in acute distress.     Appearance: Normal appearance. He is well-developed. He is not ill-appearing, toxic-appearing or diaphoretic.   HENT:       Head: Normocephalic and atraumatic.      Jaw: No trismus.      Right Ear: Hearing, tympanic membrane, ear canal and external ear normal.      Left Ear: Hearing, tympanic membrane, ear canal and external ear normal.      Nose: Nose normal. No nasal deformity, mucosal edema or rhinorrhea.      Right Sinus: No maxillary sinus tenderness or frontal sinus tenderness.      Left Sinus: No maxillary sinus tenderness or frontal sinus tenderness.      Mouth/Throat:      Dentition: Normal dentition.      Pharynx: Uvula midline. No posterior oropharyngeal erythema or uvula swelling.   Eyes:      General: Lids are normal. No scleral icterus.        Right eye: No discharge.         Left eye: No discharge.      Conjunctiva/sclera: Conjunctivae normal.      Comments: Sclera clear bilat   Neck:      Trachea: Trachea and phonation normal.   Cardiovascular:      Rate and Rhythm: Normal rate and regular rhythm.      Pulses: Normal pulses.      Heart sounds: Normal heart sounds.   Pulmonary:      Effort: Pulmonary effort is normal. No respiratory distress.      Breath sounds: Normal breath sounds.   Abdominal:      General: Bowel sounds are normal. There is no distension.      Palpations: Abdomen is soft. There is no mass or pulsatile mass.      Tenderness: There is no abdominal tenderness.   Musculoskeletal:         General: Tenderness and signs of injury present. No deformity.      Cervical back: Full passive range of motion without pain, normal range of motion and neck supple.      Comments: PATIENT DOES HAVE FULL RANGE OF MOTION WITH SOME TENDERNESS TO PALPATION AT THE AC JOINT AND DELTOID REGION.  NO SIGNIFICANT PAIN ON INTERNAL EXTERNAL ROTATION.  DISTALLY NEUROVASCULARLY INTACT   Skin:     General: Skin is warm and dry.      Coloration: Skin is not pale.   Neurological:      Mental Status: He is alert and oriented to person, place, and time.      Motor: No abnormal muscle tone.      Coordination: Coordination normal.    Psychiatric:         Speech: Speech normal.         Behavior: Behavior normal. Behavior is cooperative.         Thought Content: Thought content normal.         Judgment: Judgment normal.        X-Ray Acromioclavicular Joints Bilateral W WO Weights    Result Date: 12/6/2024  EXAMINATION: XR ACROMIOCLAVICULAR JOINTS BILATERAL W WO WEIGHTS CLINICAL HISTORY: Pain in left shoulder TECHNIQUE: Bilateral AC joints two views COMPARISON: None FINDINGS: The AC joints are well maintained bilaterally.  No fracture or dislocation.  No bone destruction identified .     See above Electronically signed by: Avila Bedolla MD Date:    12/06/2024 Time:    10:05    X-Ray Shoulder 2 or More Views Left    Result Date: 12/6/2024  EXAMINATION: XR SHOULDER COMPLETE 2 OR MORE VIEWS LEFT CLINICAL HISTORY: Pain in left shoulder TECHNIQUE: Two or three views of the left shoulder were performed. COMPARISON: None FINDINGS: AP, axillary and scapular Y radiographs of the left shoulder demonstrate no evidence of acute fracture or dislocation.  Humeral head is well seen within the glenoid fossa.  The acromioclavicular distance is within normal limits.  Visualized left hemithorax is clear.     No evidence for acute injury Electronically signed by: Giancarlo Dennis MD Date:    12/06/2024 Time:    09:56    XR CHEST PA AND LATERAL    Result Date: 12/5/2024  EXAMINATION: XR CHEST PA AND LATERAL CLINICAL HISTORY: Other sprain of left shoulder joint, initial encounter TECHNIQUE: PA and lateral views of the chest were performed. COMPARISON: None FINDINGS: The cardiomediastinal silhouette is not enlarged.  There is no pleural effusion.  The trachea is midline.  The lungs are symmetrically expanded bilaterally without evidence of acute parenchymal process. No large focal consolidation seen.  There is no pneumothorax.  The osseous structures are unremarkable.     1. No acute cardiopulmonary process. Electronically signed by: Bon Lozoya MD Date:    12/05/2024  Time:    18:19       Assessment:      1. Encounter related to worker's compensation claim    2. Acute pain of left shoulder      Plan:     PATIENT PRESENTS FOR FOLLOW-UP AFTER LAST NIGHT'S URGENT CARE VISIT.  DEDICATED X-RAYS OF THE LEFT SHOULDER SHOWS NO ACUTE FRACTURE DISLOCATION.  THERE IS SOME ASYMMETRY FROM THE RIGHT DISTAL CLAVICLE TO THE LEFT DISTAL CLAVICLE HOWEVER NO OBVIOUS FRACTURE.  RADIOLOGY HAS READ THIS AS NORMAL AND NEGATIVE.  IT IS NOT HIGH RIDING AND NO SIGNS OF SEPARATION.  ENCOURAGED REST, ICE, LIGHT DUTY, ANTI-INFLAMMATORY AND TO FOLLOW UP IN 1 WEEK.  PROVIDED SLING FOR COMFORT.  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  THERE WAS AN UPWARD PUSHING MOTION AND NO DIRECT IMPACT OR FALL OR TRAUMA.     Patient Instructions: Attention not to aggravate affected area, Use splint as directed   Restrictions: No above the shoulder/overhead work, Limited use of left hand and arm, No lifting/pushing/pulling more than 10 lbs (restrictions apply to the left upper extremity)  Follow up in about 1 week (around 12/13/2024).

## 2024-12-06 NOTE — LETTER
[facility] - [place]  [place] [address]  Phone: [phone]  Fax: [phone]  [facility]: [id number]    Pt Name: Sameer HERRERA Hand  Injury Date: 12/05/2024   Employee ID: [id number] Date of treatment: 12/06/2024   Company: [employer]      Appointment Time: 08:10 AM Arrived: 8:02am   Provider: Reji Proctor MD Time Out: 10:50am     Office Treatment:   1. Encounter related to worker's compensation claim    2. Acute pain of left shoulder          Patient Instructions: Attention not to aggravate affected area, Use splint as directed      Restrictions: No above the shoulder/overhead work, Limited use of left hand and arm, No lifting/pushing/pulling more than 10 lbs (restrictions apply to the left upper extremity)     Return Appointment: 12/13/2024 at 10:30am.    [facility]

## 2024-12-13 ENCOUNTER — OFFICE VISIT (OUTPATIENT)
Dept: URGENT CARE | Facility: CLINIC | Age: 29
End: 2024-12-13
Payer: OTHER MISCELLANEOUS

## 2024-12-13 VITALS — HEIGHT: 68 IN | WEIGHT: 132 LBS | BODY MASS INDEX: 20 KG/M2

## 2024-12-13 DIAGNOSIS — S43.402D SPRAIN OF LEFT SHOULDER, SUBSEQUENT ENCOUNTER: ICD-10-CM

## 2024-12-13 DIAGNOSIS — M25.512 ACUTE PAIN OF LEFT SHOULDER: ICD-10-CM

## 2024-12-13 DIAGNOSIS — M24.812 INTERNAL DERANGEMENT OF LEFT SHOULDER: ICD-10-CM

## 2024-12-13 DIAGNOSIS — Z02.6 ENCOUNTER RELATED TO WORKER'S COMPENSATION CLAIM: Primary | ICD-10-CM

## 2024-12-13 PROCEDURE — 99214 OFFICE O/P EST MOD 30 MIN: CPT | Mod: S$GLB,,, | Performed by: EMERGENCY MEDICINE

## 2024-12-13 NOTE — LETTER
[facility] - [place]  [place] [address]  Phone: [phone]  Fax: [phone]  [facility]: [id number]    Pt Name: Sameer HERRERA Hand  Injury Date: 12/05/2024   Employee ID: [id number] Date of Treatment: 12/13/2024   Company: [employer]      Appointment Time: 10:15 AM Arrived: 10:38 AM   Provider: Reji Proctor MD Time Out:12:35 PM     Office Treatment:   1. Encounter related to worker's compensation claim    2. Internal derangement of left shoulder          Patient Instructions: Attention not to aggravate affected area, Daily home exercises/warm soaks, MRI to be scheduled once authorized, PT to be scheduled once authorized, Referral to specialist to be scheduled, once authorized ([facility]. [facility]. [id number]. CALL IF NEEDED FOR UPDATE WITH REFERRALS/MRI)        Restrictions: No lifting/pushing/pulling more than 10 lbs, Limited use of left hand and arm, No above the shoulder/overhead work (restrictions apply to the left UE)     Return Appointment: 1/3/2025 at 9:30 AM    KW

## 2024-12-13 NOTE — PROGRESS NOTES
Subjective:      Patient ID: Sameer Chakraborty is a 29 y.o. male.    Chief Complaint: Shoulder Injury    Patient's place of employment -  [employer]  Patient's job title - Recruit   Date of injury -  12/5/24  Body part injured including left or right - Left Shoulder   Current work status per last visit - Not working   Improved, same, or worse - Same  Pain scale right now - 7/10      KW    PATIENT HAS HAD X-RAY SHOWING NO ACUTE BONY ABNORMALITY HOWEVER REPORTS PERSISTENT PAIN WITH DECREASED RANGE OF MOTION AND INABILITY TO PARTICIPATE IN HIS TRAINING.  PHYSICAL EXAM SHOWS PAIN WITH PALPATION OF THE AC JOINT AND GLENOHUMERAL JOINT WITH PAIN ON INTERNAL AND EXTERNAL ROTATION.  PATIENT IS REQUESTING TO SEE SPECIALIST AND I INFORMED HIM THAT I WOULD BE GLAD TO REFER HIM WHERE HE COULD SEE HIS OWN SPECIALIST ORTHOPEDIST HOWEVER MRI WOULD BE HELPFUL AS WELL AS PHYSICAL THERAPY TO IDENTIFY UNDERLYING INTERNAL DERANGEMENT OR SOFT TISSUE INJURY SUCH AS GLENOID LABRUM TEAR OR ROTATOR CUFF INJURY OR DEEPER STRUCTURAL ISSUES.  THERE HAS BEEN NO CHANGE IN EXAMINATION.  HE STATES THE SLING DOES NOT HELP VERY MUCH INFORMED HIM THAT THAT WAS JUST FOR COMFORT.  HE HAS BEEN TAKING ANTI-INFLAMMATORY WITHOUT SIGNIFICANT RELIEF.  REFERRAL MADE TO PHYSICAL THERAPY, MRI OF THE LEFT SHOULDER, ORTHOPEDIST OF CHOICE.  FORMS FILLED OUT THAT HE SHOULD NOT BE USING HIS LEFT UPPER EXTREMITY UNTIL DEFINITIVE DIAGNOSIS AND PLAN IN PLACE.  RETURN TO CLINIC IN 2-3 WEEKS.      ROS  Constitution: Negative for chills and fever.   HENT:  Negative for postnasal drip, sinus pain and sore throat.    Neck: Negative for neck pain and neck stiffness.   Cardiovascular:  Negative for chest pain and palpitations.   Respiratory:  Negative for cough and shortness of breath.    Genitourinary:  Negative for dysuria and hematuria.   Musculoskeletal:  Positive for pain, joint pain, joint swelling, abnormal ROM of joint, muscle cramps and muscle ache.   Skin:  Negative for  wound and laceration.   Neurological:  Negative for altered mental status, numbness and tingling.   Psychiatric/Behavioral:  Negative for altered mental status.      Objective:     Physical Exam  Vitals and nursing note reviewed.   Constitutional:       General: He is not in acute distress.     Appearance: Normal appearance. He is well-developed. He is not ill-appearing, toxic-appearing or diaphoretic.   HENT:      Head: Normocephalic and atraumatic.      Jaw: No trismus.      Right Ear: Hearing, tympanic membrane, ear canal and external ear normal.      Left Ear: Hearing, tympanic membrane, ear canal and external ear normal.      Nose: Nose normal. No nasal deformity, mucosal edema or rhinorrhea.      Right Sinus: No maxillary sinus tenderness or frontal sinus tenderness.      Left Sinus: No maxillary sinus tenderness or frontal sinus tenderness.      Mouth/Throat:      Dentition: Normal dentition.      Pharynx: Uvula midline. No posterior oropharyngeal erythema or uvula swelling.   Eyes:      General: Lids are normal. No scleral icterus.        Right eye: No discharge.         Left eye: No discharge.      Conjunctiva/sclera: Conjunctivae normal.      Comments: Sclera clear bilat   Neck:      Trachea: Trachea and phonation normal.   Cardiovascular:      Rate and Rhythm: Normal rate and regular rhythm.      Pulses: Normal pulses.      Heart sounds: Normal heart sounds.   Pulmonary:      Effort: Pulmonary effort is normal. No respiratory distress.      Breath sounds: Normal breath sounds.   Abdominal:      General: Bowel sounds are normal. There is no distension.      Palpations: Abdomen is soft. There is no mass or pulsatile mass.      Tenderness: There is no abdominal tenderness.   Musculoskeletal:         General: Tenderness and signs of injury present. No deformity.      Cervical back: Full passive range of motion without pain, normal range of motion and neck supple.      Comments: PATIENT DOES HAVE FULL RANGE OF  MOTION WITH SOME TENDERNESS TO PALPATION AT THE AC JOINT AND DELTOID REGION.  NO SIGNIFICANT PAIN ON INTERNAL EXTERNAL ROTATION.  DISTALLY NEUROVASCULARLY INTACT   Skin:     General: Skin is warm and dry.      Coloration: Skin is not pale.   Neurological:      Mental Status: He is alert and oriented to person, place, and time.      Motor: No abnormal muscle tone.      Coordination: Coordination normal.   Psychiatric:         Speech: Speech normal.         Behavior: Behavior normal. Behavior is cooperative.         Thought Content: Thought content normal.         Judgment: Judgment normal.        X-Ray Acromioclavicular Joints Bilateral W WO Weights    Result Date: 12/6/2024  EXAMINATION: XR ACROMIOCLAVICULAR JOINTS BILATERAL W WO WEIGHTS CLINICAL HISTORY: Pain in left shoulder TECHNIQUE: Bilateral AC joints two views COMPARISON: None FINDINGS: The AC joints are well maintained bilaterally.  No fracture or dislocation.  No bone destruction identified .     See above Electronically signed by: Avila Bedolla MD Date:    12/06/2024 Time:    10:05    X-Ray Shoulder 2 or More Views Left    Result Date: 12/6/2024  EXAMINATION: XR SHOULDER COMPLETE 2 OR MORE VIEWS LEFT CLINICAL HISTORY: Pain in left shoulder TECHNIQUE: Two or three views of the left shoulder were performed. COMPARISON: None FINDINGS: AP, axillary and scapular Y radiographs of the left shoulder demonstrate no evidence of acute fracture or dislocation.  Humeral head is well seen within the glenoid fossa.  The acromioclavicular distance is within normal limits.  Visualized left hemithorax is clear.     No evidence for acute injury Electronically signed by: Giancarlo Dennis MD Date:    12/06/2024 Time:    09:56    XR CHEST PA AND LATERAL    Result Date: 12/5/2024  EXAMINATION: XR CHEST PA AND LATERAL CLINICAL HISTORY: Other sprain of left shoulder joint, initial encounter TECHNIQUE: PA and lateral views of the chest were performed. COMPARISON: None FINDINGS: The  cardiomediastinal silhouette is not enlarged.  There is no pleural effusion.  The trachea is midline.  The lungs are symmetrically expanded bilaterally without evidence of acute parenchymal process. No large focal consolidation seen.  There is no pneumothorax.  The osseous structures are unremarkable.     1. No acute cardiopulmonary process. Electronically signed by: Bon Lozoya MD Date:    12/05/2024 Time:    18:19   ALTHOUGH X-RAY NEGATIVE, HAVING PERSISTENT PAIN AND DECREASED RANGE OF MOTION, EXAM CONCERNING FOR INTERNAL DERANGEMENT AND WILL GET MRI, PHYSICAL THERAPY HAS BEEN ORDERED AS WELL AS PATIENT REQUESTS ORTHOPEDICS SPECIALISTS REFERRAL IN CONJUNCTION WITH THE MRI.   Assessment:      1. Encounter related to worker's compensation claim    2. Internal derangement of left shoulder    3. Sprain of left shoulder, subsequent encounter    4. Acute pain of left shoulder      Plan:     PATIENT HAS HAD X-RAY SHOWING NO ACUTE BONY ABNORMALITY HOWEVER REPORTS PERSISTENT PAIN WITH DECREASED RANGE OF MOTION AND INABILITY TO PARTICIPATE IN HIS TRAINING.  PHYSICAL EXAM SHOWS PAIN WITH PALPATION OF THE AC JOINT AND GLENOHUMERAL JOINT WITH PAIN ON INTERNAL AND EXTERNAL ROTATION.  PATIENT IS REQUESTING TO SEE SPECIALIST AND I INFORMED HIM THAT I WOULD BE GLAD TO REFER HIM WHERE HE COULD SEE HIS OWN SPECIALIST ORTHOPEDIST HOWEVER MRI WOULD BE HELPFUL AS WELL AS PHYSICAL THERAPY TO IDENTIFY UNDERLYING INTERNAL DERANGEMENT OR SOFT TISSUE INJURY SUCH AS GLENOID LABRUM TEAR OR ROTATOR CUFF INJURY OR DEEPER STRUCTURAL ISSUES.  THERE HAS BEEN NO CHANGE IN EXAMINATION.  HE STATES THE SLING DOES NOT HELP VERY MUCH INFORMED HIM THAT THAT WAS JUST FOR COMFORT.  HE HAS BEEN TAKING ANTI-INFLAMMATORY WITHOUT SIGNIFICANT RELIEF.  REFERRAL MADE TO PHYSICAL THERAPY, MRI OF THE LEFT SHOULDER, ORTHOPEDIST OF CHOICE.  FORMS FILLED OUT THAT HE SHOULD NOT BE USING HIS LEFT UPPER EXTREMITY UNTIL DEFINITIVE DIAGNOSIS AND PLAN IN PLACE.  RETURN  TO CLINIC IN 2-3 WEEKS.     Patient Instructions: Attention not to aggravate affected area, Daily home exercises/warm soaks, MRI to be scheduled once authorized, PT to be scheduled once authorized, Ref[place] to specialist to be scheduled, once [place] [place] [employer] [facility]. [id number]. CALL IF NEEDED FOR UPDATE WITH REFERRALS/MRI)   Restrictions: No lifting/pushing/pulling more than 10 lbs, Limited use of left hand and arm, No above the shoulder/overhead work (restrictions apply to the left UE)  Follow up in about 3 weeks (around 1/3/2025).

## 2025-01-06 ENCOUNTER — TELEPHONE (OUTPATIENT)
Dept: URGENT CARE | Facility: CLINIC | Age: 30
End: 2025-01-06
Payer: MEDICAID

## 2025-01-06 NOTE — TELEPHONE ENCOUNTER
Called patient in reference to his missed [facility] Appointment and there was no answer, I received the patients voicemail, left a message for the patient and the reason for the call. URIEL

## 2025-01-09 ENCOUNTER — OFFICE VISIT (OUTPATIENT)
Dept: URGENT CARE | Facility: CLINIC | Age: 30
End: 2025-01-09
Payer: OTHER MISCELLANEOUS

## 2025-01-09 VITALS
HEART RATE: 73 BPM | SYSTOLIC BLOOD PRESSURE: 128 MMHG | BODY MASS INDEX: 20 KG/M2 | RESPIRATION RATE: 19 BRPM | HEIGHT: 68 IN | WEIGHT: 132 LBS | DIASTOLIC BLOOD PRESSURE: 83 MMHG | OXYGEN SATURATION: 99 %

## 2025-01-09 DIAGNOSIS — S43.402D SPRAIN OF LEFT SHOULDER, SUBSEQUENT ENCOUNTER: ICD-10-CM

## 2025-01-09 DIAGNOSIS — Z02.6 ENCOUNTER RELATED TO WORKER'S COMPENSATION CLAIM: Primary | ICD-10-CM

## 2025-01-09 DIAGNOSIS — M24.812 INTERNAL DERANGEMENT OF LEFT SHOULDER: ICD-10-CM

## 2025-01-09 NOTE — LETTER
[facility] - [place] [address]  [place] [address]  Phone: [phone]  Fax: [phone]  [facility] [employer] [facility]: [email or web address]    Pt Name: Sameer HERRERA Hand  Injury Date: 12/05/2024   Employee ID: [id number] Date of First Treatment: 01/09/2025   Company: [employer]YE[employer]      Appointment Time: 09:00 AM Arrived: 8:45 AM   Provider: Reji Proctor MD Time Out:10:18 AM      Office Treatment:   1. Encounter related to worker's compensation claim    2. Internal derangement of left shoulder    3. Sprain of left shoulder, subsequent encounter          Patient Instructions: Attention not to aggravate affected area, MRI to be scheduled once authorized, PT to be scheduled once authorized ([id number])      Restrictions: No lifting/pushing/pulling more than 10 lbs, Limited use of left hand and arm, No above the shoulder/overhead work (RESTRICTIONS APPLY TO THE LUE)     Return Appointment: 1/23/2025 at 9:00 AM.

## 2025-01-09 NOTE — PROGRESS NOTES
Subjective:      Patient ID: Sameer Chakraborty is a 29 y.o. male.    Chief Complaint: Shoulder Pain    Patient's place of employment -  [employer]  Patient's job title - Recruit   Date of injury -  12/5/24  Body part injured including left or right - Left Shoulder   Current work status per last visit - Not Working  Improved, same, or worse - Same  Pain scale right now - 6/10  SB.     PATIENT REPORTS CONTINUED DISCOMFORT TO THE LEFT SHOULDER.  DESPITE NEGATIVE X-RAYS AND RANGE OF MOTION INTACT WITH PAIN ON INTERNAL ROTATION AND ABDUCTION PAST 90°, PATIENT INSISTENT THAT HE IS GOING TO NEED TO SEE THE ORTHOPEDIST.  I INFORMED HIM THAT HE MAY NOT NEED ORTHOPEDIST AND CERTAINLY DO NOT FEEL THAT SURGERY WILL BE NEEDED AT THIS POINT UNTIL WE GET MORE INFORMATION WITH THE MRI.  PHYSICAL THERAPY HAS ALREADY BEEN ORDERED AS WELL AS MRI OF THE LEFT SHOULDER.  THIS IS PENDING IN THE CASE OF WORKER'S COMP  AS WELL AS [employer].  THE MRI OF THE LEFT SHOULDER WILL BE THE RATE LIMITING FACTOR OF HIS PROGRESS.  CONTINUE LIGHT DUTY RESTRICTIONS RETURN IN 2 WEEKS.    ROS  Constitution: Negative for chills and fever.   HENT:  Negative for postnasal drip, sinus pain and sore throat.    Neck: Negative for neck pain and neck stiffness.   Cardiovascular:  Negative for chest pain and palpitations.   Respiratory:  Negative for cough and shortness of breath.    Genitourinary:  Negative for dysuria and hematuria.   Musculoskeletal:  Positive for pain, joint pain, abnormal ROM of joint and muscle ache.   Skin:  Negative for wound and laceration.   Neurological:  Negative for altered mental status, numbness and tingling.   Psychiatric/Behavioral:  Negative for altered mental status.      Objective:     Physical Exam  Vitals and nursing note reviewed.   Constitutional:       General: He is not in acute distress.     Appearance: Normal appearance. He is well-developed. He is not ill-appearing, toxic-appearing or  diaphoretic.   HENT:      Head: Normocephalic and atraumatic.      Jaw: No trismus.      Right Ear: Hearing, tympanic membrane, ear canal and external ear normal.      Left Ear: Hearing, tympanic membrane, ear canal and external ear normal.      Nose: Nose normal. No nasal deformity, mucosal edema or rhinorrhea.      Right Sinus: No maxillary sinus tenderness or frontal sinus tenderness.      Left Sinus: No maxillary sinus tenderness or frontal sinus tenderness.      Mouth/Throat:      Dentition: Normal dentition.      Pharynx: Uvula midline. No posterior oropharyngeal erythema or uvula swelling.   Eyes:      General: Lids are normal. No scleral icterus.        Right eye: No discharge.         Left eye: No discharge.      Conjunctiva/sclera: Conjunctivae normal.      Comments: Sclera clear bilat   Neck:      Trachea: Trachea and phonation normal.   Cardiovascular:      Rate and Rhythm: Normal rate and regular rhythm.      Pulses: Normal pulses.      Heart sounds: Normal heart sounds.   Pulmonary:      Effort: Pulmonary effort is normal. No respiratory distress.      Breath sounds: Normal breath sounds.   Abdominal:      General: Bowel sounds are normal. There is no distension.      Palpations: Abdomen is soft. There is no mass or pulsatile mass.      Tenderness: There is no abdominal tenderness.   Musculoskeletal:         General: Tenderness and signs of injury present. No deformity.      Cervical back: Full passive range of motion without pain, normal range of motion and neck supple.      Comments: PATIENT DOES HAVE FULL RANGE OF MOTION WITH SOME TENDERNESS TO PALPATION AT THE AC JOINT AND DELTOID REGION.  NO SIGNIFICANT PAIN ON INTERNAL EXTERNAL ROTATION.  DISTALLY NEUROVASCULARLY INTACT PAIN WITH INTERNAL ROTATION AND ABDUCTION PAST 90°.   Skin:     General: Skin is warm and dry.      Coloration: Skin is not pale.   Neurological:      Mental Status: He is alert and oriented to person, place, and time.      Motor:  No abnormal muscle tone.      Coordination: Coordination normal.   Psychiatric:         Speech: Speech normal.         Behavior: Behavior normal. Behavior is cooperative.         Thought Content: Thought content normal.         Judgment: Judgment normal.         Assessment:      1. Encounter related to worker's compensation claim    2. Internal derangement of left shoulder    3. Sprain of left shoulder, subsequent encounter      Plan:     PATIENT REPORTS CONTINUED DISCOMFORT TO THE LEFT SHOULDER.  DESPITE NEGATIVE X-RAYS AND RANGE OF MOTION INTACT WITH PAIN ON INTERNAL ROTATION AND ABDUCTION PAST 90°, PATIENT INSISTENT THAT HE IS GOING TO NEED TO SEE THE ORTHOPEDIST.  I INFORMED HIM THAT HE MAY NOT NEED ORTHOPEDIST AND CERTAINLY DO NOT FEEL THAT SURGERY WILL BE NEEDED AT THIS POINT UNTIL WE GET MORE INFORMATION WITH THE MRI.  PHYSICAL THERAPY HAS ALREADY BEEN ORDERED AS WELL AS MRI OF THE LEFT SHOULDER.  THIS IS PENDING IN THE CASE OF WORKER'S COMP  AS WELL AS [employer].  THE MRI OF THE LEFT SHOULDER WILL BE THE RATE LIMITING FACTOR OF HIS PROGRESS.  CONTINUE LIGHT DUTY RESTRICTIONS RETURN IN 2 WEEKS.     Patient Instructions: Attention not to aggravate affected area, MRI to be scheduled once authorized, PT to be scheduled once authorized ([id number])   Restrictions: No lifting/pushing/pulling more than 10 lbs, Limited use of left hand and arm, No above the shoulder/overhead work (RESTRICTIONS APPLY TO THE LUE)  Follow up in about 2 weeks (around 1/23/2025).

## 2025-03-12 ENCOUNTER — TELEPHONE (OUTPATIENT)
Dept: URGENT CARE | Facility: CLINIC | Age: 30
End: 2025-03-12
Payer: MEDICAID

## 2025-03-12 NOTE — TELEPHONE ENCOUNTER
Called the patient in reference to getting him scheduled to see Dr. Proctor and he states that PT is going to have to  be placed on hold and he states that he was informed after the 12 session of PT at [facility] that he needed follow up with the doctor in reference to his injury. The patient states that he went had and had his MRI and [place] recommended that he go see a speacialist and that's when he choose to be seen by another doctor because of his MRI and that's why he's changing his care team. He went seen  with [facility] and will be following up with him and his company instead of [facility] and also states that Dr. Swann is putting PT on hold because he has placed a referral for surgery due to the results of the MRI to his Workers Comp Carrier and they are awaiting approval for the surgery. The patient states after the surgery he will be doing his therapy with [facility] Instead. [employer]